# Patient Record
Sex: FEMALE | URBAN - METROPOLITAN AREA
[De-identification: names, ages, dates, MRNs, and addresses within clinical notes are randomized per-mention and may not be internally consistent; named-entity substitution may affect disease eponyms.]

---

## 2020-05-14 ENCOUNTER — APPOINTMENT (RX ONLY)
Dept: URBAN - METROPOLITAN AREA CLINIC 282 | Facility: CLINIC | Age: 30
Setting detail: DERMATOLOGY
End: 2020-05-14

## 2020-05-14 DIAGNOSIS — D18.0 HEMANGIOMA: ICD-10-CM

## 2020-05-14 PROBLEM — D48.5 NEOPLASM OF UNCERTAIN BEHAVIOR OF SKIN: Status: ACTIVE | Noted: 2020-05-14

## 2020-05-14 PROCEDURE — 99202 OFFICE O/P NEW SF 15 MIN: CPT

## 2020-05-14 PROCEDURE — ? COUNSELING

## 2020-05-14 PROCEDURE — ? PATIENT SPECIFIC COUNSELING

## 2020-05-14 PROCEDURE — ? ADDITIONAL NOTES

## 2020-05-14 NOTE — HPI: SKIN LESION
What Type Of Note Output Would You Prefer (Optional)?: Standard Output
How Severe Is Your Skin Lesion?: mild
Has Your Skin Lesion Been Treated?: not been treated
Is This A New Presentation, Or A Follow-Up?: Skin Lesion
Additional History: Pt states that upon onset of this lesion, she noticed a discoloration. She attributed it to a possible bruise. However, this discoloration has persisted and she has developed a firmness about a day ago and was seen this morning by her PCP (Iman Flores, NP), and is present now from a referral. This area is tender, red, and firm.   Denies bleeding, itching, trauma, including fillers, cuts, scrapes, etc to the area. Denies F/C, recent illness, contacts with known illness, new products.

## 2020-05-14 NOTE — PROCEDURE: PATIENT SPECIFIC COUNSELING
Because pt would like removal, I recommend referral to ENT.\\n\\nCase discussed with Dr. Bajwa and he agrees with treatment plan.
Detail Level: Zone